# Patient Record
Sex: MALE | Race: WHITE | Employment: FULL TIME | URBAN - METROPOLITAN AREA
[De-identification: names, ages, dates, MRNs, and addresses within clinical notes are randomized per-mention and may not be internally consistent; named-entity substitution may affect disease eponyms.]

---

## 2022-01-16 ENCOUNTER — HOSPITAL ENCOUNTER (EMERGENCY)
Facility: HOSPITAL | Age: 42
Discharge: HOME | End: 2022-01-17
Payer: MEDICAID

## 2022-01-16 ENCOUNTER — APPOINTMENT (OUTPATIENT)
Dept: ULTRASOUND IMAGING | Facility: HOSPITAL | Age: 42
End: 2022-01-16
Payer: MEDICAID

## 2022-01-16 DIAGNOSIS — I82.411 THROMBOSIS OF RIGHT FEMORAL VEIN (HCC): Primary | ICD-10-CM

## 2022-01-16 PROCEDURE — 80048 BASIC METABOLIC PNL TOTAL CA: CPT | Performed by: NURSE PRACTITIONER

## 2022-01-16 PROCEDURE — 99284 EMERGENCY DEPT VISIT MOD MDM: CPT

## 2022-01-16 PROCEDURE — 93971 EXTREMITY STUDY: CPT

## 2022-01-16 PROCEDURE — 36415 COLL VENOUS BLD VENIPUNCTURE: CPT

## 2022-01-17 VITALS
DIASTOLIC BLOOD PRESSURE: 66 MMHG | SYSTOLIC BLOOD PRESSURE: 122 MMHG | OXYGEN SATURATION: 95 % | WEIGHT: 170 LBS | BODY MASS INDEX: 23.03 KG/M2 | TEMPERATURE: 98 F | HEART RATE: 80 BPM | HEIGHT: 72 IN | RESPIRATION RATE: 18 BRPM

## 2022-01-17 LAB
ANION GAP SERPL CALC-SCNC: 5 MMOL/L (ref 0–18)
BUN BLD-MCNC: 15 MG/DL (ref 7–18)
BUN/CREAT SERPL: 17.6 (ref 10–20)
CALCIUM BLD-MCNC: 8.9 MG/DL (ref 8.5–10.1)
CHLORIDE SERPL-SCNC: 110 MMOL/L (ref 98–112)
CO2 SERPL-SCNC: 29 MMOL/L (ref 21–32)
CREAT BLD-MCNC: 0.85 MG/DL
GLUCOSE BLD-MCNC: 96 MG/DL (ref 70–99)
OSMOLALITY SERPL CALC.SUM OF ELEC: 299 MOSM/KG (ref 275–295)
POTASSIUM SERPL-SCNC: 3.9 MMOL/L (ref 3.5–5.1)
SODIUM SERPL-SCNC: 144 MMOL/L (ref 136–145)

## 2022-01-17 NOTE — CM/SW NOTE
Received call from patient stating that he was discharged on Xarelto but when he went to [place] in [facility] with his written prescription the pharmacist stated that he would need a prior auth from his PCP.     [facility] called [address]

## 2022-01-17 NOTE — ED INITIAL ASSESSMENT (HPI)
Patient here with R calf pain that began about a week ago. Patient denies trauma or injury. Patient states he is concerned for blood clot.

## 2022-01-17 NOTE — ED PROVIDER NOTES
Patient Seen in: [facility] AND [facility] Emergency Department      History   Patient presents with:  Leg Pain    Stated Complaint: R calf pain    Subjective:   40yo/m with no chronic medical problems reports to the ED with complaints of right calf pain x 1 wee Rate and Rhythm: Normal rate and regular rhythm. Heart sounds: Normal heart sounds. Pulmonary:      Effort: Pulmonary effort is normal.      Breath sounds: Normal breath sounds.    Abdominal:      General: Bowel sounds are normal.      [facility] [id number]  [phone]    In 2 days            Medications Prescribed:  Current Discharge Medication List    START taking these medications    rivaroxaban 15 & 20 MG Oral Tablet Therapy Pack  Take As Directed based on package instru

## 2022-01-17 NOTE — ED QUICK NOTES
Pt to the ed for reports of right calf pain taht started 1 week ago. No injury prior to onset of pain. CMS intact, distal pulses palpable. Continue to monitor.

## 2022-02-14 PROBLEM — Z79.01 LONG TERM (CURRENT) USE OF ANTICOAGULANTS: Status: ACTIVE | Noted: 2022-02-14

## 2022-02-14 PROBLEM — I82.409 ACUTE DVT (DEEP VENOUS THROMBOSIS) (HCC): Status: ACTIVE | Noted: 2022-02-14

## 2024-01-07 ENCOUNTER — HOSPITAL ENCOUNTER (INPATIENT)
Facility: HOSPITAL | Age: 44
LOS: 1 days | Discharge: HOME | End: 2024-01-09
Attending: EMERGENCY MEDICINE | Admitting: INTERNAL MEDICINE
Payer: MEDICAID

## 2024-01-07 DIAGNOSIS — R79.89 ELEVATED TROPONIN: Primary | ICD-10-CM

## 2024-01-07 DIAGNOSIS — I26.02 ACUTE SADDLE PULMONARY EMBOLISM WITH ACUTE COR PULMONALE (HCC): ICD-10-CM

## 2024-01-07 LAB
ANION GAP SERPL CALC-SCNC: 6 MMOL/L (ref 0–18)
BASOPHILS # BLD AUTO: 0.05 X10(3) UL (ref 0–0.2)
BASOPHILS NFR BLD AUTO: 0.5 %
BUN BLD-MCNC: 12 MG/DL (ref 9–23)
BUN/CREAT SERPL: 15.8 (ref 10–20)
CALCIUM BLD-MCNC: 9.1 MG/DL (ref 8.7–10.4)
CHLORIDE SERPL-SCNC: 105 MMOL/L (ref 98–112)
CHOLEST SERPL-MCNC: 153 MG/DL (ref ?–200)
CO2 SERPL-SCNC: 28 MMOL/L (ref 21–32)
CREAT BLD-MCNC: 0.76 MG/DL
DEPRECATED RDW RBC AUTO: 39.8 FL (ref 35.1–46.3)
EGFRCR SERPLBLD CKD-EPI 2021: 114 ML/MIN/1.73M2 (ref 60–?)
EOSINOPHIL # BLD AUTO: 0.1 X10(3) UL (ref 0–0.7)
EOSINOPHIL NFR BLD AUTO: 1.1 %
ERYTHROCYTE [DISTWIDTH] IN BLOOD BY AUTOMATED COUNT: 12.5 % (ref 11–15)
FLUAV + FLUBV RNA SPEC NAA+PROBE: NEGATIVE
FLUAV + FLUBV RNA SPEC NAA+PROBE: NEGATIVE
GLUCOSE BLD-MCNC: 121 MG/DL (ref 70–99)
HCT VFR BLD AUTO: 43.1 %
HDLC SERPL-MCNC: 39 MG/DL (ref 40–59)
HGB BLD-MCNC: 15.2 G/DL
IMM GRANULOCYTES # BLD AUTO: 0.02 X10(3) UL (ref 0–1)
IMM GRANULOCYTES NFR BLD: 0.2 %
LDLC SERPL CALC-MCNC: 100 MG/DL (ref ?–100)
LYMPHOCYTES # BLD AUTO: 0.92 X10(3) UL (ref 1–4)
LYMPHOCYTES NFR BLD AUTO: 9.8 %
MCH RBC QN AUTO: 30.8 PG (ref 26–34)
MCHC RBC AUTO-ENTMCNC: 35.3 G/DL (ref 31–37)
MCV RBC AUTO: 87.4 FL
MONOCYTES # BLD AUTO: 0.61 X10(3) UL (ref 0.1–1)
MONOCYTES NFR BLD AUTO: 6.5 %
NEUTROPHILS # BLD AUTO: 7.72 X10 (3) UL (ref 1.5–7.7)
NEUTROPHILS # BLD AUTO: 7.72 X10(3) UL (ref 1.5–7.7)
NEUTROPHILS NFR BLD AUTO: 81.9 %
NONHDLC SERPL-MCNC: 114 MG/DL (ref ?–130)
OSMOLALITY SERPL CALC.SUM OF ELEC: 289 MOSM/KG (ref 275–295)
PLATELET # BLD AUTO: 185 10(3)UL (ref 150–450)
POTASSIUM SERPL-SCNC: 4.3 MMOL/L (ref 3.5–5.1)
RBC # BLD AUTO: 4.93 X10(6)UL
RSV RNA SPEC NAA+PROBE: NEGATIVE
SARS-COV-2 RNA RESP QL NAA+PROBE: NOT DETECTED
SODIUM SERPL-SCNC: 139 MMOL/L (ref 136–145)
TRIGL SERPL-MCNC: 71 MG/DL (ref 30–149)
TROPONIN I SERPL HS-MCNC: 553 NG/L
VLDLC SERPL CALC-MCNC: 12 MG/DL (ref 0–30)
WBC # BLD AUTO: 9.4 X10(3) UL (ref 4–11)

## 2024-01-07 PROCEDURE — 99285 EMERGENCY DEPT VISIT HI MDM: CPT

## 2024-01-07 PROCEDURE — 93010 ELECTROCARDIOGRAM REPORT: CPT

## 2024-01-07 PROCEDURE — 80048 BASIC METABOLIC PNL TOTAL CA: CPT | Performed by: EMERGENCY MEDICINE

## 2024-01-07 PROCEDURE — 93005 ELECTROCARDIOGRAM TRACING: CPT

## 2024-01-07 PROCEDURE — 96361 HYDRATE IV INFUSION ADD-ON: CPT

## 2024-01-07 PROCEDURE — 84484 ASSAY OF TROPONIN QUANT: CPT | Performed by: EMERGENCY MEDICINE

## 2024-01-07 PROCEDURE — 85025 COMPLETE CBC W/AUTO DIFF WBC: CPT | Performed by: EMERGENCY MEDICINE

## 2024-01-07 PROCEDURE — 80061 LIPID PANEL: CPT | Performed by: EMERGENCY MEDICINE

## 2024-01-07 PROCEDURE — 0241U SARS-COV-2/FLU A AND B/RSV BY PCR (GENEXPERT): CPT | Performed by: EMERGENCY MEDICINE

## 2024-01-08 ENCOUNTER — APPOINTMENT (OUTPATIENT)
Dept: ULTRASOUND IMAGING | Facility: HOSPITAL | Age: 44
End: 2024-01-08
Attending: STUDENT IN AN ORGANIZED HEALTH CARE EDUCATION/TRAINING PROGRAM
Payer: MEDICAID

## 2024-01-08 ENCOUNTER — APPOINTMENT (OUTPATIENT)
Dept: CV DIAGNOSTICS | Facility: HOSPITAL | Age: 44
End: 2024-01-08
Attending: STUDENT IN AN ORGANIZED HEALTH CARE EDUCATION/TRAINING PROGRAM
Payer: MEDICAID

## 2024-01-08 ENCOUNTER — APPOINTMENT (OUTPATIENT)
Dept: CT IMAGING | Facility: HOSPITAL | Age: 44
End: 2024-01-08
Attending: EMERGENCY MEDICINE
Payer: MEDICAID

## 2024-01-08 ENCOUNTER — APPOINTMENT (OUTPATIENT)
Dept: INTERVENTIONAL RADIOLOGY/VASCULAR | Facility: HOSPITAL | Age: 44
End: 2024-01-08
Attending: RADIOLOGY
Payer: MEDICAID

## 2024-01-08 PROBLEM — I26.02 ACUTE SADDLE PULMONARY EMBOLISM WITH ACUTE COR PULMONALE (HCC): Status: ACTIVE | Noted: 2024-01-08

## 2024-01-08 LAB
ALBUMIN SERPL-MCNC: 3.7 G/DL (ref 3.2–4.8)
ALBUMIN/GLOB SERPL: 1.4 {RATIO} (ref 1–2)
ALP LIVER SERPL-CCNC: 176 U/L
ALT SERPL-CCNC: 140 U/L
ANION GAP SERPL CALC-SCNC: 5 MMOL/L (ref 0–18)
APTT PPP: 30.5 SECONDS (ref 23.3–35.6)
APTT PPP: 49.1 SECONDS (ref 23.3–35.6)
APTT PPP: 58 SECONDS (ref 23.3–35.6)
AST SERPL-CCNC: 76 U/L (ref ?–34)
ATRIAL RATE: 123 BPM
BASOPHILS # BLD AUTO: 0.06 X10(3) UL (ref 0–0.2)
BASOPHILS NFR BLD AUTO: 0.7 %
BILIRUB SERPL-MCNC: 2.2 MG/DL (ref 0.3–1.2)
BUN BLD-MCNC: 10 MG/DL (ref 9–23)
BUN/CREAT SERPL: 16.4 (ref 10–20)
CALCIUM BLD-MCNC: 8.7 MG/DL (ref 8.7–10.4)
CHLORIDE SERPL-SCNC: 109 MMOL/L (ref 98–112)
CO2 SERPL-SCNC: 24 MMOL/L (ref 21–32)
CREAT BLD-MCNC: 0.61 MG/DL
DEPRECATED RDW RBC AUTO: 40.2 FL (ref 35.1–46.3)
EGFRCR SERPLBLD CKD-EPI 2021: 122 ML/MIN/1.73M2 (ref 60–?)
EOSINOPHIL # BLD AUTO: 0.16 X10(3) UL (ref 0–0.7)
EOSINOPHIL NFR BLD AUTO: 2 %
ERYTHROCYTE [DISTWIDTH] IN BLOOD BY AUTOMATED COUNT: 12.6 % (ref 11–15)
GLOBULIN PLAS-MCNC: 2.7 G/DL (ref 2.8–4.4)
GLUCOSE BLD-MCNC: 114 MG/DL (ref 70–99)
HCT VFR BLD AUTO: 40.8 %
HGB BLD-MCNC: 13.7 G/DL
IMM GRANULOCYTES # BLD AUTO: 0.02 X10(3) UL (ref 0–1)
IMM GRANULOCYTES NFR BLD: 0.2 %
INR BLD: 1.14 (ref 0.8–1.2)
ISTAT ACTIVATED CLOTTING TIME: 179 SECONDS (ref 125–137)
LYMPHOCYTES # BLD AUTO: 1.64 X10(3) UL (ref 1–4)
LYMPHOCYTES NFR BLD AUTO: 20.2 %
MCH RBC QN AUTO: 29.4 PG (ref 26–34)
MCHC RBC AUTO-ENTMCNC: 33.6 G/DL (ref 31–37)
MCV RBC AUTO: 87.6 FL
MONOCYTES # BLD AUTO: 0.6 X10(3) UL (ref 0.1–1)
MONOCYTES NFR BLD AUTO: 7.4 %
NEUTROPHILS # BLD AUTO: 5.64 X10 (3) UL (ref 1.5–7.7)
NEUTROPHILS # BLD AUTO: 5.64 X10(3) UL (ref 1.5–7.7)
NEUTROPHILS NFR BLD AUTO: 69.5 %
OSMOLALITY SERPL CALC.SUM OF ELEC: 286 MOSM/KG (ref 275–295)
P AXIS: 61 DEGREES
P-R INTERVAL: 134 MS
PLATELET # BLD AUTO: 167 10(3)UL (ref 150–450)
POTASSIUM SERPL-SCNC: 3.8 MMOL/L (ref 3.5–5.1)
PROT SERPL-MCNC: 6.4 G/DL (ref 5.7–8.2)
PROTHROMBIN TIME: 15.3 SECONDS (ref 11.6–14.8)
Q-T INTERVAL: 318 MS
QRS DURATION: 86 MS
QTC CALCULATION (BEZET): 455 MS
R AXIS: 51 DEGREES
RBC # BLD AUTO: 4.66 X10(6)UL
SODIUM SERPL-SCNC: 138 MMOL/L (ref 136–145)
T AXIS: 30 DEGREES
TROPONIN I SERPL HS-MCNC: 223 NG/L
VENTRICULAR RATE: 123 BPM
WBC # BLD AUTO: 8.1 X10(3) UL (ref 4–11)

## 2024-01-08 PROCEDURE — 85730 THROMBOPLASTIN TIME PARTIAL: CPT | Performed by: INTERNAL MEDICINE

## 2024-01-08 PROCEDURE — 85610 PROTHROMBIN TIME: CPT | Performed by: INTERNAL MEDICINE

## 2024-01-08 PROCEDURE — B31T1ZZ FLUOROSCOPY OF LEFT PULMONARY ARTERY USING LOW OSMOLAR CONTRAST: ICD-10-PCS | Performed by: RADIOLOGY

## 2024-01-08 PROCEDURE — 99153 MOD SED SAME PHYS/QHP EA: CPT | Performed by: RADIOLOGY

## 2024-01-08 PROCEDURE — 85347 COAGULATION TIME ACTIVATED: CPT

## 2024-01-08 PROCEDURE — 02CQ3ZZ EXTIRPATION OF MATTER FROM RIGHT PULMONARY ARTERY, PERCUTANEOUS APPROACH: ICD-10-PCS | Performed by: RADIOLOGY

## 2024-01-08 PROCEDURE — 93970 EXTREMITY STUDY: CPT | Performed by: STUDENT IN AN ORGANIZED HEALTH CARE EDUCATION/TRAINING PROGRAM

## 2024-01-08 PROCEDURE — 85025 COMPLETE CBC W/AUTO DIFF WBC: CPT | Performed by: INTERNAL MEDICINE

## 2024-01-08 PROCEDURE — 36014 PLACE CATHETER IN ARTERY: CPT | Performed by: RADIOLOGY

## 2024-01-08 PROCEDURE — 85730 THROMBOPLASTIN TIME PARTIAL: CPT | Performed by: EMERGENCY MEDICINE

## 2024-01-08 PROCEDURE — 99152 MOD SED SAME PHYS/QHP 5/>YRS: CPT | Performed by: RADIOLOGY

## 2024-01-08 PROCEDURE — 37184 PRIM ART M-THRMBC 1ST VSL: CPT | Performed by: RADIOLOGY

## 2024-01-08 PROCEDURE — 93306 TTE W/DOPPLER COMPLETE: CPT | Performed by: STUDENT IN AN ORGANIZED HEALTH CARE EDUCATION/TRAINING PROGRAM

## 2024-01-08 PROCEDURE — 84484 ASSAY OF TROPONIN QUANT: CPT | Performed by: STUDENT IN AN ORGANIZED HEALTH CARE EDUCATION/TRAINING PROGRAM

## 2024-01-08 PROCEDURE — B31S1ZZ FLUOROSCOPY OF RIGHT PULMONARY ARTERY USING LOW OSMOLAR CONTRAST: ICD-10-PCS | Performed by: RADIOLOGY

## 2024-01-08 PROCEDURE — 71260 CT THORAX DX C+: CPT | Performed by: EMERGENCY MEDICINE

## 2024-01-08 PROCEDURE — 85730 THROMBOPLASTIN TIME PARTIAL: CPT | Performed by: STUDENT IN AN ORGANIZED HEALTH CARE EDUCATION/TRAINING PROGRAM

## 2024-01-08 PROCEDURE — 02CR3ZZ EXTIRPATION OF MATTER FROM LEFT PULMONARY ARTERY, PERCUTANEOUS APPROACH: ICD-10-PCS | Performed by: RADIOLOGY

## 2024-01-08 PROCEDURE — 80053 COMPREHEN METABOLIC PANEL: CPT | Performed by: INTERNAL MEDICINE

## 2024-01-08 PROCEDURE — 96374 THER/PROPH/DIAG INJ IV PUSH: CPT

## 2024-01-08 RX ORDER — HEPARIN SODIUM AND DEXTROSE 10000; 5 [USP'U]/100ML; G/100ML
18 INJECTION INTRAVENOUS ONCE
Status: COMPLETED | OUTPATIENT
Start: 2024-01-08 | End: 2024-01-08

## 2024-01-08 RX ORDER — HEPARIN SODIUM 1000 [USP'U]/ML
80 INJECTION, SOLUTION INTRAVENOUS; SUBCUTANEOUS ONCE
Status: DISCONTINUED | OUTPATIENT
Start: 2024-01-08 | End: 2024-01-08

## 2024-01-08 RX ORDER — HYDROCODONE BITARTRATE AND ACETAMINOPHEN 5; 325 MG/1; MG/1
1 TABLET ORAL ONCE
Status: COMPLETED | OUTPATIENT
Start: 2024-01-08 | End: 2024-01-08

## 2024-01-08 RX ORDER — KETOROLAC TROMETHAMINE 30 MG/ML
30 INJECTION, SOLUTION INTRAMUSCULAR; INTRAVENOUS EVERY 6 HOURS PRN
Status: DISCONTINUED | OUTPATIENT
Start: 2024-01-08 | End: 2024-01-09

## 2024-01-08 RX ORDER — MIDAZOLAM HYDROCHLORIDE 1 MG/ML
INJECTION INTRAMUSCULAR; INTRAVENOUS
Status: COMPLETED
Start: 2024-01-08 | End: 2024-01-08

## 2024-01-08 RX ORDER — ONDANSETRON 2 MG/ML
4 INJECTION INTRAMUSCULAR; INTRAVENOUS EVERY 6 HOURS PRN
Status: DISCONTINUED | OUTPATIENT
Start: 2024-01-08 | End: 2024-01-09

## 2024-01-08 RX ORDER — HEPARIN SODIUM 1000 [USP'U]/ML
80 INJECTION, SOLUTION INTRAVENOUS; SUBCUTANEOUS ONCE
Status: COMPLETED | OUTPATIENT
Start: 2024-01-08 | End: 2024-01-08

## 2024-01-08 RX ORDER — ACETAMINOPHEN 500 MG
500 TABLET ORAL EVERY 4 HOURS PRN
Status: DISCONTINUED | OUTPATIENT
Start: 2024-01-08 | End: 2024-01-09

## 2024-01-08 RX ORDER — LIDOCAINE HYDROCHLORIDE 20 MG/ML
INJECTION, SOLUTION INFILTRATION; PERINEURAL
Status: COMPLETED
Start: 2024-01-08 | End: 2024-01-08

## 2024-01-08 RX ORDER — HEPARIN SODIUM 1000 [USP'U]/ML
INJECTION, SOLUTION INTRAVENOUS; SUBCUTANEOUS
Status: COMPLETED
Start: 2024-01-08 | End: 2024-01-08

## 2024-01-08 RX ORDER — PROCHLORPERAZINE EDISYLATE 5 MG/ML
5 INJECTION INTRAMUSCULAR; INTRAVENOUS EVERY 8 HOURS PRN
Status: DISCONTINUED | OUTPATIENT
Start: 2024-01-08 | End: 2024-01-09

## 2024-01-08 RX ORDER — HEPARIN SODIUM AND DEXTROSE 10000; 5 [USP'U]/100ML; G/100ML
18 INJECTION INTRAVENOUS ONCE
Status: DISCONTINUED | OUTPATIENT
Start: 2024-01-08 | End: 2024-01-08

## 2024-01-08 RX ORDER — HEPARIN SODIUM 1000 [USP'U]/ML
30 INJECTION, SOLUTION INTRAVENOUS; SUBCUTANEOUS ONCE
Status: COMPLETED | OUTPATIENT
Start: 2024-01-08 | End: 2024-01-08

## 2024-01-08 RX ORDER — NICOTINE 21 MG/24HR
1 PATCH, TRANSDERMAL 24 HOURS TRANSDERMAL DAILY
Status: DISCONTINUED | OUTPATIENT
Start: 2024-01-08 | End: 2024-01-09

## 2024-01-08 RX ORDER — HEPARIN SODIUM AND DEXTROSE 10000; 5 [USP'U]/100ML; G/100ML
INJECTION INTRAVENOUS CONTINUOUS
Status: DISCONTINUED | OUTPATIENT
Start: 2024-01-08 | End: 2024-01-09

## 2024-01-08 NOTE — ED PROVIDER NOTES
Patient endorsed pending CT chest.  CT with concerning findings of saddle PE and evidence of right heart strain.  Patient with ongoing tachycardia in the ED however other vital signs normal.  Patient was started on heparin.  I discussed with Dr. Wiggins for admission and Dr. Enrique for IR consultation.  Dr. Enrique recommends heparin anticoagulation overnight, n.p.o. status and plan for intervention in the morning.

## 2024-01-08 NOTE — PLAN OF CARE
Problem: Patient Centered Care  Goal: Patient preferences are identified and integrated in the patient's plan of care  Description: Interventions:  - What would you like us to know as we care for you? Im from home  - Provide timely, complete, and accurate information to patient/family  - Incorporate patient and family knowledge, values, beliefs, and cultural backgrounds into the planning and delivery of care  - Encourage patient/family to participate in care and decision-making at the level they choose  - Honor patient and family perspectives and choices  Outcome: Progressing     Problem: Patient/Family Goals  Goal: Patient/Family Long Term Goal  Description: Patient's Long Term Goal: Get stronger    Interventions:  - See additional Care Plan goals for specific interventions  Outcome: Progressing  Goal: Patient/Family Short Term Goal  Description: Patient's Short Term Goal: Go home    Interventions:   - See additional Care Plan goals for specific interventions  Outcome: Progressing     Problem: CARDIOVASCULAR - ADULT  Goal: Maintains optimal cardiac output and hemodynamic stability  Description: INTERVENTIONS:  - Monitor vital signs, rhythm, and trends  - Monitor for bleeding, hypotension and signs of decreased cardiac output  - Evaluate effectiveness of vasoactive medications to optimize hemodynamic stability  - Monitor arterial and/or venous puncture sites for bleeding and/or hematoma  - Assess quality of pulses, skin color and temperature  - Assess for signs of decreased coronary artery perfusion - ex. Angina  - Evaluate fluid balance, assess for edema, trend weights  Outcome: Progressing  Goal: Absence of cardiac arrhythmias or at baseline  Description: INTERVENTIONS:  - Continuous cardiac monitoring, monitor vital signs, obtain 12 lead EKG if indicated  - Evaluate effectiveness of antiarrhythmic and heart rate control medications as ordered  - Initiate emergency measures for life threatening arrhythmias  -  Monitor electrolytes and administer replacement therapy as ordered  Outcome: Progressing     Problem: RESPIRATORY - ADULT  Goal: Achieves optimal ventilation and oxygenation  Description: INTERVENTIONS:  - Assess for changes in respiratory status  - Assess for changes in mentation and behavior  - Position to facilitate oxygenation and minimize respiratory effort  - Oxygen supplementation based on oxygen saturation or ABGs  - Provide Smoking Cessation handout, if applicable  - Encourage broncho-pulmonary hygiene including cough, deep breathe, Incentive Spirometry  - Assess the need for suctioning and perform as needed  - Assess and instruct to report SOB or any respiratory difficulty  - Respiratory Therapy support as indicated  - Manage/alleviate anxiety  - Monitor for signs/symptoms of CO2 retention  Outcome: Progressing

## 2024-01-08 NOTE — ED QUICK NOTES
Orders for admission, patient is aware of plan and ready to go upstairs. Any questions, please call ED RN Toni at extension [phone].     Patient Covid vaccination status: Unvaccinated     COVID Test Ordered in ED: SARS-CoV-2/Flu A and B/RSV by PCR ([facility])    COVID Suspicion at Admission: N/A    Running Infusions:  Heparin @ 16 mL/hr    Mental Status/LOC at time of transport: A&Ox3    Other pertinent information:   CIWA score: N/A   NIH score:  N/A

## 2024-01-08 NOTE — ED QUICK NOTES
Report given to IVAN Sosa. Pt and family are aware of admit and have no further questions at this time. Pt stable and resting comfortably in bed

## 2024-01-08 NOTE — CONSULTS
[facility]    Report of Consultation    Sterling Thomas Patient Status:  Inpatient    1980 MRN [id number]   Location [facility] 1W Attending Racquel Mi,    Hosp Day # 0 PCP Anita Colunga MD     Reason for Consultation:  SOB, PE.    History of Present Illness:  Sterling Thomas is a a(n) 43 year old male with history of a right leg DVT.  He was admitted with chest pain and SOB that started yesterday afternoon.  CT shows bilateral PE with right heart strain.  Troponins are elevated, ECHO is pending.  Currently he is comfortable, denies SOB.  O2 Sat 97%.     History:  Past Medical History:   Diagnosis Date    DVT of deep femoral vein, right (HCC)      History reviewed. No pertinent surgical history.  Family History   Problem Relation Age of Onset    No Known Problems Mother     No Known Problems Father     DVT/VTE Neg       reports that he has been smoking. He has been smoking an average of 1 pack per day. He has never used smokeless tobacco. He reports that he does not currently use alcohol. He reports that he does not currently use drugs.    Allergies:  No Known Allergies    Medications:    Current Facility-Administered Medications:     heparin (Porcine) 74295 units/250mL infusion CONTINUOUS, 200-3,000 Units/hr, Intravenous, Continuous    acetaminophen (Tylenol Extra Strength) tab 500 mg, 500 mg, Oral, Q4H PRN    ondansetron (Zofran) 4 MG/2ML injection 4 mg, 4 mg, Intravenous, Q6H PRN    prochlorperazine (Compazine) 10 MG/2ML injection 5 mg, 5 mg, Intravenous, Q8H PRN    Review of Systems:  A comprehensive review of systems was negative except for: Cardiovascular: positive for chest pain    Physical Exam:   General: Alert, orientated x3.  Cooperative.  No apparent distress.  Vital Signs:  Blood pressure (!) 127/91, pulse 102, temperature 98.3 °F (36.8 °C), temperature source Oral, resp. rate 20, height 72\", weight 208 lb 5.4 oz (94.5 kg), SpO2 94%.  HEENT: Exam is  unremarkable.  Neck: Supple.  Lungs: Normal respiratory effort  Cardiac: Regular rate and rhythm.  Abdomen:  Nontender.  Extremities:  No lower extremity edema noted.  Skin: Normal texture and turgor.    Laboratory Data:  Lab Results   Component Value Date    WBC 9.4 01/07/2024    HGB 15.2 01/07/2024    HCT 43.1 01/07/2024    .0 01/07/2024    CREATSERUM 0.76 01/07/2024    BUN 12 01/07/2024     01/07/2024    K 4.3 01/07/2024     01/07/2024    CO2 28.0 01/07/2024     01/07/2024    CA 9.1 01/07/2024    PTT 30.5 01/08/2024       Impression:  Patient Active Problem List   Diagnosis    Long term (current) use of anticoagulants    Acute DVT (deep venous thrombosis) (HCC)    Elevated troponin    Acute saddle pulmonary embolism with acute cor pulmonale (HCC)     Assessment/Plan:  44yo with history of a right leg DVT 2 years ago, who finished course of anticoagulation, is admitted with CT findings of an acute PE.  His symptoms of DUE and chest pain started suddenly yesterday afternoon. He denies current SOB, O2 sat 95-97% on room air.    Plan is for pulmonary artery thrombectomy. He would like his wife to be present before discussing the risks and benefits.       Thank you for allowing me to participate in the care of your patient.    JENVICKI VELÁSQUEZ, APRN  1/8/2024  8:21 AM

## 2024-01-08 NOTE — PROCEDURES
[facility]  Procedure Note    Sterling Thomas Patient Status:  Inpatient    1980 MRN [id number]   Location [facility] 1W Attending Racquel Mi,    Hosp Day # 0 PCP Anita Colunga MD     Procedure: PA angiogram with thrombectomy    Pre-Procedure Diagnosis: Elevated troponin [R79.89]  Acute saddle pulmonary embolism with acute cor pulmonale (HCC) [I26.02]      Post-Procedure Diagnosis: Elevated troponin [R79.89]  Acute saddle pulmonary embolism with acute cor pulmonale (HCC) [I26.02]    Anesthesia:  Local and Sedation    Findings:  Right CFV accessed under ultrasound.  16Fr sheath placed.    Left PA thrombectomy performed with 16Fr Flash device.  Right PA thrombectomy also performed.  Final angiogram with marked reduction in clot burden.    PA pressures:  Pre - peak 53mmHg, mean 37 mmHg   Post - peak 42 mmHg, mean 30 mmHg    Specimens: None    Blood Loss:  180mL      Complications:  None    Plan:  Transfer back to room.  Continue systemic anticoagulation.  Duration of anticoagulation per Hematology.    STEFANY SEPULVEDA MD  2024

## 2024-01-08 NOTE — CONSULTS
Brief IR Consult Note    Mr. Thomas is a 43 year old male with history of prior right lower extremity DVT (1/16/22, now not on A/C) who presents with acute shortness of breath found to have acute pulmonary emboli. In the ED, his vitals were -129, -130s, and satting 93-98% on room air. His labs were notable for Hgb 15.2, plt 185, , with troponin of 553. He underwent a CT PE, which is notable for bilateral lobar PE with associated saddle PE and evidence of right heart strain (RA/RV > 0.9) with bilateral peripheral wedge-shaped consolidations concerning for evolving lung infarcts. No history of pre-syncopal event.     Submassive, intermediate-high risk PE with hemodynamic stability at this time. The elevated cardiac biomarkers and suggestion of right-heart strain on CT makes this a high-risk intermediate risk PE, which coincides with a sPESI score of 1 (HR > 110). Given these findings would offer catheter-directed thrombectomy in conjunction with initiation of anticoagulation.    - Heparin gtt per primary team   - Please keep NPO  - Please obtain stat TTE  - Tentative plan for catheter-directed thrombectomy in the AM pending changes in clinical status  - Please let IR know if change in clinical status for which thrombectomy would be offered sooner    Brain Enrique MD  Interventional Radiology

## 2024-01-08 NOTE — CONSULTS
Pulmonary Consult     Assessment / Plan:  Acute pulmonary embolism - submassive PE with multiple pulmonary infarcts s/p mechanical thrombectomy of bilateral PA with reduction in clot burden  - Heparin gtt; transition to DOAC indefinitely at DC  - Needs hypercoag w/u and malignancy screen at discharge  - Echo with acute pHTN and reduced right sided systolic failure  - LE doppler positive for DVT  Pulmonary infarct - d/t PE above  - Pain control w/ alternating toradol & tylenol  - Repeat imaging in 6-8 weeks  Pulmonary hypertension - acute from submassive PE  - Repeat TTE 6 months to ensure resolution  Tobacco abuse   - Cessation advised  Dispo  - Will follow with you    Bairon Jordan,   Pulmonary and Critical Care Medicine      History of Present Illness:   Mr. Thomas is a 43 year old male, born on 11/11/1980 with history of PE and DVT who completed six months of warfarin treatment in January 2023.  Noted recurrent swelling of his RLE prior to arrival at the hospital.  Later started with dyspnea.  Found to have submassive saddle PE, numerous bilateral PE, DVT and pulmonary infarctions.  Remained hemodynamically stable and was taken for thrombectomy today with IR.  Has no complaints at this time    12 point ROS negative except per HPI.    Past Medical History:   Diagnosis Date    DVT of deep femoral vein, right (HCC)        History reviewed. No pertinent surgical history.    Medications Prior to Admission   Medication Sig Dispense Refill Last Dose    warfarin 5 MG Oral Tab Pt taking 3 tabs (15mg) Wed, 2 tabs (10mg) all other days as directed by coumadin clinic 180 tablet 1      No outpatient medications have been marked as taking for the 1/7/24 encounter (Hospital Encounter).      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      Social Determinants of Health     Food Insecurity: No Food Insecurity (1/8/2024)    Food Insecurity     Food Insecurity: Never true   Transportation Needs: No Transportation Needs (1/8/2024)    Transportation Needs     Lack of Transportation: No   Housing Stability: Low Risk  (1/8/2024)    Housing Stability     Housing Instability: No       Family History   Problem Relation Age of Onset    No Known Problems Mother     No Known Problems Father     DVT/VTE Neg          Exam:  Vitals:    01/08/24 1400 01/08/24 1430 01/08/24 1500 01/08/24 1530   BP: 111/77 112/81 108/76 117/80   BP Location: Right arm Right arm Right arm Right arm   Pulse: 106 103 101 101   Resp: 16 16 22 24   Temp: 98.3 °F (36.8 °C)      TempSrc: Oral      SpO2: 94% 95% 95% 96%   Weight:       Height:         General: no apparent distress, conversant  Skin: no rash, ulcers or subcutaneous nodules  Eyes: anicteric sclerae, moist conjunctivae  Head, ears, nose, throat: atraumatic, oropharynx clear with moist mucous membranes  Neck: trachea midline with no thyromegaly  Heart: regular rate and rhythm, no murmurs / rubs / gallops  Lungs: clear bilaterally, normal respiratory effort, no accessory muscle use  Extremities: LE swelling (L>R)  Psych: interactive, answering questions appropriately, appropriate affect    Labs:  Reviewed in EMR    Inpatient Medications:  Reviewed in EMR    Imaging:   Chest imaging reviewed

## 2024-01-08 NOTE — ED PROVIDER NOTES
Patient Seen in: [facility] Emergency Department    History     Chief Complaint   Patient presents with    Chest Pain    Shortness Of Breath       HPI    43-year-old male presents ER with complaint of acute shortness of breath.  Patient with a past medical history of DVT in the past no longer on blood thinners.  Patient states he been having left lower extremity swelling now for the past few days.  Patient states he became acutely short of breath few hours ago.  Patient states he feels short of breath when he exerts himself.  Patient arrives to the ER tachycardic but satting 95% room air.  Patient denies any chest pain    History reviewed.   Past Medical History:   Diagnosis Date    DVT of deep femoral vein, right (HCC)        History reviewed. History reviewed. No pertinent surgical history.      Medications :  (Not in a hospital admission)       Family History   Problem Relation Age of Onset    No Known Problems Mother     No Known Problems Father     DVT/VTE Neg        Smoking Status:   Social History     Socioeconomic History    Marital status:    Tobacco Use    Smoking status: Every Day     Packs/day: 1     Types: Cigarettes    Smokeless tobacco: Never   Vaping Use    Vaping Use: Never used   Substance and Sexual Activity    Alcohol use: Not Currently    Drug use: Not Currently       ROS  All pertinent positives for the review of systems are mentioned in the HPI  All other organ systems are reviewed and are negative.    Constitutional and vital signs reviewed.      Social History and Family History elements reviewed from today, pertinent positives to the presenting problem noted.    Physical Exam     ED Triage Vitals   BP 01/07/24 2132 122/79   Pulse 01/07/24 2132 (!) 123   Resp 01/07/24 2132 20   Temp 01/07/24 2132 99.1 °F (37.3 °C)   Temp src --    SpO2 01/07/24 2132 96 %   O2 Device 01/07/24 2230 None (Room air)       All measures to prevent infection transmission during my interaction with the  patient were taken. The patient was already wearing a droplet mask on my arrival to the room. Personal protective equipment including droplet mask, eye protection, and gloves were worn throughout the duration of the exam.  Handwashing was performed prior to and after the exam.  Stethoscope and any equipment used during my examination was cleaned with super sani-cloth germicidal wipes following the exam.     Physical Exam  Vitals and nursing note reviewed.   Constitutional:       Appearance: He is well-developed.   HENT:      Head: Normocephalic and atraumatic.      Right Ear: External ear normal.      Left Ear: External ear normal.      Nose: Nose normal.   Eyes:      Conjunctiva/sclera: Conjunctivae normal.      Pupils: Pupils are equal, round, and reactive to light.   Cardiovascular:      Rate and Rhythm: Regular rhythm. Tachycardia present.      Heart sounds: Normal heart sounds.   Pulmonary:      Effort: Pulmonary effort is normal.      Breath sounds: Normal breath sounds.   Abdominal:      General: Bowel sounds are normal.      Palpations: Abdomen is soft.   Musculoskeletal:         General: Normal range of motion.      Cervical back: Normal range of motion and neck supple.      Left lower leg: Tenderness present. Edema present.      Comments: Positive left lower extremity leg tightness on palpation, no erythema   Skin:     General: Skin is warm and dry.   Neurological:      Mental Status: He is alert and oriented to person, place, and time.      Deep Tendon Reflexes: Reflexes are normal and symmetric.   Psychiatric:         Behavior: Behavior normal.         Thought Content: Thought content normal.         Judgment: Judgment normal.         ED Course        Labs Reviewed   CBC W/ DIFFERENTIAL - Abnormal; Notable for the following components:       Result Value    Neutrophil Absolute Prelim 7.72 (*)     Neutrophil Absolute 7.72 (*)     Lymphocyte Absolute 0.92 (*)     All other components within normal limits    CBC WITH DIFFERENTIAL WITH PLATELET    Narrative:     The following orders were created for panel order CBC With Differential With Platelet.                  Procedure                               Abnormality         Status                                     ---------                               -----------         ------                                     CBC W/ DIFFERENTIAL[[id number]]          Abnormal            Final result                                                 Please view results for these tests on the individual orders.   BASIC METABOLIC PANEL (8)   TROPONIN I HIGH SENSITIVITY   SARS-COV-2/FLU A AND B/RSV BY PCR (GENEXPERT)     EKG    Rate, intervals and axes as noted on EKG Report.  Rate: 123  Rhythm: Sinus Rhythm  Reading: Sinus tachycardia, no ST deviation             Imaging Results Available and Reviewed while in ED: No results found.  ED Medications Administered:   Medications   sodium chloride 0.9 % IV bolus 1,000 mL (1,000 mL Intravenous New Bag 1/7/24 2226)         MDM     Vitals:    01/07/24 2132 01/07/24 2230   BP: 122/79 121/85   Pulse: (!) 123 (!) 124   Resp: 20 16   Temp: 99.1 °F (37.3 °C)    SpO2: 96% 95%   Weight: 86.2 kg    Height: 182.9 cm (6')      *I personally reviewed and interpreted all ED vitals.  I also personally reviewed all labs and imaging if ordered    Pulse Ox: 95%, Room air, Normal     Monitor Interpretation:   sinus tachycardia    Differential Diagnosis/ Diagnostic Considerations: Pulmonary embolism, pneumonia, URI, viral syndrome.    Medical Record Review: I personally reviewed available prior medical records for any recent pertinent discharge summaries, testing, and procedures and reviewed those reports.    Complicating Factors: The patient already has does not have any pertinent problems on file. to contribute to the complexity of this ED evaluation.    Medical Decision Making      Condition upon leaving the department: Stable    Disposition and Plan      Clinical Impression:  No diagnosis found.    Disposition:  There is no disposition on file for this visit.    Follow-up:  No follow-up provider specified.    Medications Prescribed:  Current Discharge Medication List

## 2024-01-08 NOTE — ED INITIAL ASSESSMENT (HPI)
Patient reports chest pain and SOB that began today around 1400. Hx of DVT 2 years ago. Denies being on blood thinners at this time. Pt is pallor in color.

## 2024-01-08 NOTE — H&P
[facility] Hospitalist H&P       CC:   Chief Complaint   Patient presents with    Chest Pain    Shortness Of Breath        PCP: Anita Colunga MD    History of Present Illness: Patient is a 43 year old male with PMH sig for RLE DVT in 1/16/22 s/p 6 months of coumadin no longer on AC who presented to the hospital with worsening LLE swelling and SOB. Patient says that his LLE started to swell about 3 days prior. Yesterday he acute became short of breath. Worse with exertion. Due to this he came to the hospital. In addition he admits to some L sided rib pain when he takes a deep breath or coughs but no other CP. Upon arrival to the ED, he was tachycardic in the 120s but on RA. Initial labwork was unremarkable. CTA chest showed a large saddle PE, extensive b/l Pes, multiple b/l pulmonary infarcts, and RV: LV ration of 1.4. He was started on hep gtt and admitted for further medical management,.     PMH  Past Medical History:   Diagnosis Date    DVT of deep femoral vein, right (HCC)         PSH  History reviewed. No pertinent surgical history.     ALL:  No Known Allergies     Home Medications:  No outpatient medications have been marked as taking for the 1/7/24 encounter (Hospital Encounter).         Soc Hx  Social History     Tobacco Use    Smoking status: Every Day     Packs/day: 1     Types: Cigarettes    Smokeless tobacco: Never   Substance Use Topics    Alcohol use: Not Currently        Fam Hx  Family History   Problem Relation Age of Onset    No Known Problems Mother     No Known Problems Father     DVT/VTE Neg        Review of Systems  Comprehensive ROS reviewed and negative except for what's stated above.     OBJECTIVE:  BP (!) 127/91 (BP Location: Right arm)   Pulse 102   Temp 98.3 °F (36.8 °C) (Oral)   Resp 20   Ht 6' (1.829 m)   Wt 208 lb 5.4 oz (94.5 kg)   SpO2 94%   BMI 28.26 kg/m²   General:  Alert, no distress   Head:  Normocephalic               Neck: Supple   Lungs:   Clear to  auscultation bilaterally   Chest wall:  No tenderness or deformity.       Abdomen:   Soft, non-tender. Bowel sounds normal   Extremities: LLE edema             Diagnostic Data:    CBC/Chem  Recent Labs   Lab 01/07/24  2219   WBC 9.4   HGB 15.2   MCV 87.4   .0       Recent Labs   Lab 01/07/24  2219      K 4.3      CO2 28.0   BUN 12   CREATSERUM 0.76   *   CA 9.1       No results for input(s): \"ALT\", \"AST\", \"ALB\", \"AMYLASE\", \"LIPASE\", \"LDH\" in the last 168 hours.    Invalid input(s): \"ALPHOS\", \"TBIL\", \"DBIL\", \"TPROT\"    No results for input(s): \"TROP\" in the last 168 hours.    Radiology: CT CHEST PE AORTA (IV ONLY) (CPT=71260)    Result Date: 1/8/2024  CONCLUSION:   Saddle PE with propagation throughout all lobes of the lungs including occlusive/near occlusive disease throughout the basilar arteries with imaging signs of RV strain  Multifocal basilar predominant bilateral pulmonary infarcts   [facility] provided a prelim report for this exam. This final report has no significant discrepancies with the [facility] report.   Dictated by (CST): Prince Penny MD on 1/08/2024 at 9:31 AM     Finalized by (CST): Prince Penny MD on 1/08/2024 at 9:34 AM             ASSESSMENT / PLAN:    Patient is a 43 year old male with PMH sig for RLE DVT in 1/16/22 s/p 6 months of coumadin no longer on AC who presented to the hospital with worsening LLE swelling and SOB.    Submassive PE  Acute saddle PE with b/l pulmonary infarcts  Cor pulmonale  - patient presented with LLE edema and acute onset SOB  - only risk factor is daily cigarette smoking, no hx of recent surgery/travel, no family hx of blood clots  - discussed outpatient testing for genetic causes  - CTA chest reviewed, shows saddle PE, extensive b/l PE, multiple pulmonary infarcts, RV: LV ratio of 1.4  - vitals are stable, no hypoxemia  - IR consulted, tentative plan for thrombectomy after discussions with patients wife  - on hep gtt, will transition  to likely coumadin for dc ( will check if DOAC is covered). Discussed that patient will need to be on life long anticoagulation  - check b/l LE venous dopplers  - STAT TTE ordered  - only risk factor is daily cigarette smoking    Elevated troponin  - likely demand ischemia from above  - initial troponin of 553  - EKG reviewed, shows sinus tachycardia, no acute ST changes  - will get another troponin for completeness sake  - TTE ordered    Hx of RLE DVT in 1/16/22  - s/p completion of 6 months of coumadin    Daily cigarette smoking  - smokes 1ppd  - counseled on smoking cessation for 5 minutes  - nicotine patch ordered    FN:  - IVF: none  - Diet: NPO    DVT Prophy: hep gtt  Lines: PIV    Dispo: pending clinical course    Outpatient records or previous hospital records reviewed.     Further recommendations pending patient's clinical course.  [facility] hospitalist to continue to follow patient while in house    Patient and/or patient's family given opportunity to ask questions and note understanding and agreeing with therapeutic plan as outlined    Thank You,  Racquel Mi DO    Hospitalist with [facility]  Answering Service number: [phone]

## 2024-01-08 NOTE — ED QUICK NOTES
Wife (Radha) would like to be called in the AM with estimated time to IR if possible.     [phone]

## 2024-01-09 VITALS
SYSTOLIC BLOOD PRESSURE: 110 MMHG | RESPIRATION RATE: 18 BRPM | OXYGEN SATURATION: 94 % | HEIGHT: 72 IN | HEART RATE: 106 BPM | DIASTOLIC BLOOD PRESSURE: 76 MMHG | WEIGHT: 208.31 LBS | BODY MASS INDEX: 28.22 KG/M2 | TEMPERATURE: 98 F

## 2024-01-09 LAB
ALBUMIN SERPL-MCNC: 3.8 G/DL (ref 3.2–4.8)
ALBUMIN/GLOB SERPL: 1.4 {RATIO} (ref 1–2)
ALP LIVER SERPL-CCNC: 150 U/L
ALT SERPL-CCNC: 99 U/L
ANION GAP SERPL CALC-SCNC: 6 MMOL/L (ref 0–18)
APTT PPP: 59.7 SECONDS (ref 23.3–35.6)
APTT PPP: 64.8 SECONDS (ref 23.3–35.6)
AST SERPL-CCNC: 31 U/L (ref ?–34)
BILIRUB SERPL-MCNC: 0.6 MG/DL (ref 0.3–1.2)
BUN BLD-MCNC: 14 MG/DL (ref 9–23)
BUN/CREAT SERPL: 20.3 (ref 10–20)
CALCIUM BLD-MCNC: 8.6 MG/DL (ref 8.7–10.4)
CHLORIDE SERPL-SCNC: 105 MMOL/L (ref 98–112)
CO2 SERPL-SCNC: 25 MMOL/L (ref 21–32)
CREAT BLD-MCNC: 0.69 MG/DL
EGFRCR SERPLBLD CKD-EPI 2021: 118 ML/MIN/1.73M2 (ref 60–?)
GLOBULIN PLAS-MCNC: 2.8 G/DL (ref 2.8–4.4)
GLUCOSE BLD-MCNC: 106 MG/DL (ref 70–99)
OSMOLALITY SERPL CALC.SUM OF ELEC: 283 MOSM/KG (ref 275–295)
POTASSIUM SERPL-SCNC: 3.5 MMOL/L (ref 3.5–5.1)
PROT SERPL-MCNC: 6.6 G/DL (ref 5.7–8.2)
SODIUM SERPL-SCNC: 136 MMOL/L (ref 136–145)

## 2024-01-09 PROCEDURE — 85730 THROMBOPLASTIN TIME PARTIAL: CPT | Performed by: STUDENT IN AN ORGANIZED HEALTH CARE EDUCATION/TRAINING PROGRAM

## 2024-01-09 PROCEDURE — 80053 COMPREHEN METABOLIC PANEL: CPT | Performed by: STUDENT IN AN ORGANIZED HEALTH CARE EDUCATION/TRAINING PROGRAM

## 2024-01-09 NOTE — PAYOR COMM NOTE
--------------  ADMISSION REVIEW     Payor: [facility]  Subscriber #:  [id number]  Authorization Number: [id number]    Admit date: 1/8/24  Admit time:  2:05 AM       1/7 Patient Seen in: [facility] Emergency Department     History          Chief Complaint   Patient presents with    Chest Pain    Shortness Of Breath       43-year-old male presents ER with complaint of acute shortness of breath.  Patient with a past medical history of DVT in the past no longer on blood thinners.  Patient states he been having left lower extremity swelling now for the past few days.  Patient states he became acutely short of breath few hours ago.  Patient states he feels short of breath when he exerts himself.  Patient arrives to the ER tachycardic but satting 95% room air.  Patient denies any chest pain     History reviewed.        Past Medical History:   Diagnosis Date    DVT of deep femoral vein, right (HCC)         History reviewed. History reviewed. No pertinent surgical history.        Physical Exam           ED Triage Vitals   BP 01/07/24 2132 122/79   Pulse 01/07/24 2132 (!) 123   Resp 01/07/24 2132 20   Temp 01/07/24 2132 99.1 °F (37.3 °C)   Temp src --     SpO2 01/07/24 2132 96 %   O2 Device 01/07/24 2230 None (Room air)        Physical Exam  Vitals and nursing note reviewed.   Constitutional:       Appearance: He is well-developed.   HENT:      Head: Normocephalic and atraumatic.      Right Ear: External ear normal.      Left Ear: External ear normal.      Nose: Nose normal.   Eyes:      Conjunctiva/sclera: Conjunctivae normal.      Pupils: Pupils are equal, round, and reactive to light.   Cardiovascular:      Rate and Rhythm: Regular rhythm. Tachycardia present.      Heart sounds: Normal heart sounds.   Pulmonary:      Effort: Pulmonary effort is normal.      Breath sounds: Normal breath sounds.   Abdominal:      General: Bowel sounds are normal.      Palpations: Abdomen is soft.    Musculoskeletal:         General: Normal range of motion.      Cervical back: Normal range of motion and neck supple.      Left lower leg: Tenderness present. Edema present.      Comments: Positive left lower extremity leg tightness on palpation, no erythema           Labs Reviewed   CBC W/ DIFFERENTIAL - Abnormal; Notable for the following components:       Result Value      Neutrophil Absolute Prelim 7.72 (*)       Neutrophil Absolute 7.72 (*)       Lymphocyte Absolute 0.92 (*)       All other components within normal limits   BASIC METABOLIC PANEL (8)   TROPONIN I HIGH SENSITIVITY   SARS-COV-2/FLU A AND B/RSV BY PCR (GENEXPERT)      EKG     Rate, intervals and axes as noted on EKG Report.  Rate: 123  Rhythm: Sinus Rhythm  Reading: Sinus tachycardia, no ST deviation        ED Medications Administered:   Medications   sodium chloride 0.9 % IV bolus 1,000 mL (1,000 mL Intravenous New Bag 1/7/24 2226)           Monitor Interpretation:   sinus tachycardia     Differential Diagnosis/ Diagnostic Considerations: Pulmonary embolism, pneumonia, URI, viral syndrome.     Medical Record Review: I personally reviewed available prior medical records for any recent pertinent discharge summaries, testing, and procedures and reviewed those reports.     Complicating Factors: The patient already has does not have any pertinent problems on file. to contribute to the complexity of this ED evaluation.       REVIEW DOCUMENTATION:     ED Provider Notes        ED Provider Notes signed by Srini Llanos MD at 1/8/2024  1:39 AM       Author: Srini Llanos MD Service: -- Author Type: Physician    Filed: 1/8/2024  1:39 AM Date of Service: 1/8/2024  1:37 AM Status: Signed    : Srini Llanos MD (Physician)         Patient endorsed pending CT chest.  CT with concerning findings of saddle PE and evidence of right heart strain.  Patient with ongoing tachycardia in the ED however other vital signs normal.  Patient was started  on heparin.  I discussed with Dr. Wiggins for admission and Dr. Enrique for IR consultation.  Dr. Enrique recommends heparin anticoagulation overnight, n.p.o. status and plan for intervention in the morning.        [facility] Hospitalist H&P     History of Present Illness: Patient is a 43 year old male with PMH sig for RLE DVT in 1/16/22 s/p 6 months of coumadin no longer on AC who presented to the hospital with worsening LLE swelling and SOB. Patient says that his LLE started to swell about 3 days prior. Yesterday he acute became short of breath. Worse with exertion. Due to this he came to the hospital. In addition he admits to some L sided rib pain when he takes a deep breath or coughs but no other CP. Upon arrival to the ED, he was tachycardic in the 120s but on RA. Initial labwork was unremarkable. CTA chest showed a large saddle PE, extensive b/l Pes, multiple b/l pulmonary infarcts, and RV: LV ration of 1.4. He was started on hep gtt and admitted for further medical management,.     OBJECTIVE:  BP (!) 127/91 (BP Location: Right arm)   Pulse 102   Temp 98.3 °F (36.8 °C) (Oral)   Resp 20   Ht 6' (1.829 m)   Wt 208 lb 5.4 oz (94.5 kg)   SpO2 94%   BMI 28.26 kg/m²   General:  Alert, no distress   Head:  Normocephalic               Neck: Supple   Lungs:   Clear to auscultation bilaterally   Chest wall:  No tenderness or deformity.       Abdomen:   Soft, non-tender. Bowel sounds normal   Extremities: LLE edema     Lab 01/07/24  2219   WBC 9.4   HGB 15.2   MCV 87.4   .0        K 4.3      CO2 28.0   BUN 12   CREATSERUM 0.76   *   CA 9.1       CT CHEST PE AORTA     Saddle PE with propagation throughout all lobes of the lungs including occlusive/near occlusive disease throughout the basilar arteries with imaging signs of RV strain  Multifocal basilar predominant bilateral pulmonary infarcts         ASSESSMENT / PLAN:    Patient is a 43 year old male with PMH sig for RLE DVT in  22 s/p 6 months of coumadin no longer on AC who presented to the hospital with worsening LLE swelling and SOB.    Submassive PE  Acute saddle PE with b/l pulmonary infarcts  Cor pulmonale  - patient presented with LLE edema and acute onset SOB  - only risk factor is daily cigarette smoking, no hx of recent surgery/travel, no family hx of blood clots  - discussed outpatient testing for genetic causes  - CTA chest reviewed, shows saddle PE, extensive b/l PE, multiple pulmonary infarcts, RV: LV ratio of 1.4  - vitals are stable, no hypoxemia  - IR consulted, tentative plan for thrombectomy after discussions with patients wife  - on hep gtt, will transition to likely coumadin for dc ( will check if DOAC is covered). Discussed that patient will need to be on life long anticoagulation  - check b/l LE venous dopplers  - STAT TTE ordered  - only risk factor is daily cigarette smoking    Elevated troponin  - likely demand ischemia from above  - initial troponin of 553  - EKG reviewed, shows sinus tachycardia, no acute ST changes  - will get another troponin for completeness sake  - TTE ordered    Hx of RLE DVT in 22  - s/p completion of 6 months of coumadin    Daily cigarette smoking  - smokes 1ppd  - counseled on smoking cessation for 5 minutes  - nicotine patch ordered    FN:  - IVF: none  - Diet: NPO    DVT Prophy: hep gtt  Lines: PIV    Dispo: pending clinical course      Procedure Note           S[facility]ling Thomas Patient Status:  Inpatient    1980 MRN [id number]   Location [facility] 1W Attending Racquel Mi, DO   Hosp Day # 0 PCP Anita Colunga MD      Procedure: PA angiogram with thrombectomy     Pre-Procedure Diagnosis: Elevated troponin [R79.89]  Acute saddle pulmonary embolism with acute cor pulmonale (HCC) [I26.02]        Post-Procedure Diagnosis: Elevated troponin [R79.89]  Acute saddle pulmonary embolism with acute cor pulmonale (HCC) [I26.02]     Anesthesia:  Local and Sedation      Findings:  Right CFV accessed under ultrasound.  16Fr sheath placed.     Left PA thrombectomy performed with 16Fr Flash device.  Right PA thrombectomy also performed.  Final angiogram with marked reduction in clot burden.     PA pressures:  Pre - peak 53mmHg, mean 37 mmHg   Post - peak 42 mmHg, mean 30 mmHg     Specimens: None     Blood Loss:  180mL                                Complications:  None     Plan:  Transfer back to room.  Continue systemic anticoagulation.  Duration of anticoagulation per Hematology.    Pulmonary Consult      Assessment / Plan:  Acute pulmonary embolism - submassive PE with multiple pulmonary infarcts s/p mechanical thrombectomy of bilateral PA with reduction in clot burden  - Heparin gtt; transition to DOAC indefinitely at DC  - Needs hypercoag w/u and malignancy screen at discharge  - Echo with acute pHTN and reduced right sided systolic failure  - LE doppler positive for DVT  Pulmonary infarct - d/t PE above  - Pain control w/ alternating toradol & tylenol  - Repeat imaging in 6-8 weeks  Pulmonary hypertension - acute from submassive PE  - Repeat TTE 6 months to ensure resolution  Tobacco abuse   - Cessation advised  Dispo  - Will follow with you    MEDICATIONS ADMINISTERED IN LAST 1 DAY:  apixaban (Eliquis) tab 10 mg       Date Action Dose Route User    Discharged on 1/9/2024 1/9/2024 1115 Given 10 mg Oral Maribel De Guzman, IVAN          heparin (Porcine) 41135 units/250mL infusion CONTINUOUS       Date Action Dose Route User    Discharged on 1/9/2024 1/9/2024 0915 Hi-Risk Rate/Dose Change 2,200 Units/hr Intravenous Maribel De Guzman, IVAN    1/9/2024 0344 New Bag 2,050 Units/hr Intravenous Sheila Jones, IVAN    1/9/2024 0209 Hi-Risk Rate/Dose Change 2,050 Units/hr Intravenous Sheila Jones, RN    1/8/2024 1923 Hi-Risk Rate/Dose Change 1,900 Units/hr Intravenous Sheila Jones, RN    1/8/2024 1701 New Bag 1,750 Units/hr Intravenous Lis Strauss RN          ketorolac  (Toradol) 30 MG/ML injection 30 mg       Date Action Dose Route User    Discharged on 1/9/2024 1/8/2024 1719 Given 30 mg Intravenous Lis Strauss RN          nicotine (Nicoderm CQ) 14 MG/24HR patch 1 patch       Date Action Dose Route User    Discharged on 1/9/2024 1/9/2024 0831 Patch Applied 1 patch Transdermal (Left Upper Arm) Maribel De Guzman, RN

## 2024-01-09 NOTE — PLAN OF CARE
Monitoring vital signs- stable at this time. No acute changes noted throughout shift. Telemetry monitoring. Tolerating diet. Voiding by urinal. Heparin gtt for DVT prophylaxis. Up with independently in room. Encouraged frequent position changes and use of incentive spirometer. Fall precautions maintained- bed alarm on, bed locked in lowest position, call light and personal belongings within reach, non-skid socks in place to bilateral feet. Frequent rounding by nursing staff. Plan to discharge home once medically cleared.      Patient cleared by internal medicine and social work. Going home. IV removed, discharge education provided, patient sent home with all personal belongings, and discharge instructions. Addressed additional questions.      Problem: Patient Centered Care  Goal: Patient preferences are identified and integrated in the patient's plan of care  Description: Interventions:  - What would you like us to know as we care for you? Im from home  - Provide timely, complete, and accurate information to patient/family  - Incorporate patient and family knowledge, values, beliefs, and cultural backgrounds into the planning and delivery of care  - Encourage patient/family to participate in care and decision-making at the level they choose  - Honor patient and family perspectives and choices  Outcome: Progressing     Problem: Patient/Family Goals  Goal: Patient/Family Long Term Goal  Description: Patient's Long Term Goal: Get stronger    Interventions:  - See additional Care Plan goals for specific interventions  Outcome: Progressing  Goal: Patient/Family Short Term Goal  Description: Patient's Short Term Goal: Go home    Interventions:   - See additional Care Plan goals for specific interventions  Outcome: Progressing     Problem: CARDIOVASCULAR - ADULT  Goal: Maintains optimal cardiac output and hemodynamic stability  Description: INTERVENTIONS:  - Monitor vital signs, rhythm, and trends  - Monitor for bleeding,  hypotension and signs of decreased cardiac output  - Evaluate effectiveness of vasoactive medications to optimize hemodynamic stability  - Monitor arterial and/or venous puncture sites for bleeding and/or hematoma  - Assess quality of pulses, skin color and temperature  - Assess for signs of decreased coronary artery perfusion - ex. Angina  - Evaluate fluid balance, assess for edema, trend weights  Outcome: Progressing  Goal: Absence of cardiac arrhythmias or at baseline  Description: INTERVENTIONS:  - Continuous cardiac monitoring, monitor vital signs, obtain 12 lead EKG if indicated  - Evaluate effectiveness of antiarrhythmic and heart rate control medications as ordered  - Initiate emergency measures for life threatening arrhythmias  - Monitor electrolytes and administer replacement therapy as ordered  Outcome: Progressing     Problem: RESPIRATORY - ADULT  Goal: Achieves optimal ventilation and oxygenation  Description: INTERVENTIONS:  - Assess for changes in respiratory status  - Assess for changes in mentation and behavior  - Position to facilitate oxygenation and minimize respiratory effort  - Oxygen supplementation based on oxygen saturation or ABGs  - Provide Smoking Cessation handout, if applicable  - Encourage broncho-pulmonary hygiene including cough, deep breathe, Incentive Spirometry  - Assess the need for suctioning and perform as needed  - Assess and instruct to report SOB or any respiratory difficulty  - Respiratory Therapy support as indicated  - Manage/alleviate anxiety  - Monitor for signs/symptoms of CO2 retention  Outcome: Progressing

## 2024-01-09 NOTE — DISCHARGE SUMMARY
General Medicine Discharge Summary     Patient ID:  Sterling Thomas  43 year old  11/11/1980    Admit date: 1/7/2024    Discharge date and time: 01/09/24    Attending Physician: Racquel Mi DO     Consults: IP CONSULT TO INTERVENTIONAL RADIOLOGY  IP CONSULT TO PULMONOLOGY    Primary Care Physician: Anita Colunga MD     Reason for admission: submassive PE    Risk of Readmission Lace+ Score: 24  59-90 High Risk  29-58 Medium Risk  0-28   Low Risk    Discharge Diagnoses: Elevated troponin [R79.89]  Acute saddle pulmonary embolism with acute cor pulmonale (HCC) [I26.02]  See Additional Discharge Diagnoses in Hospital Course    Discharged Condition: good    Follow-up with labs/images appointments:   F/u with PCP  F/u with pulmonary  F/u with hematology    Exam  Gen: No acute distress  Pulm: Lungs clear, normal respiratory effort  CV: Heart with regular rate and rhythm  Abd: Abdomen soft,   EXT: no edema     HPI:   Patient is a 43 year old male with PMH sig for RLE DVT in 1/16/22 s/p 6 months of coumadin no longer on AC who presented to the hospital with worsening LLE swelling and SOB. Patient says that his LLE started to swell about 3 days prior. Yesterday he acute became short of breath. Worse with exertion. Due to this he came to the hospital. In addition he admits to some L sided rib pain when he takes a deep breath or coughs but no other CP. Upon arrival to the ED, he was tachycardic in the 120s but on RA. Initial labwork was unremarkable. CTA chest showed a large saddle PE, extensive b/l Pes, multiple b/l pulmonary infarcts, and RV: LV ration of 1.4. He was started on hep gtt and admitted for further medical management,.      Hospital Course:   Patient is a 43 year old male with PMH sig for RLE DVT in 1/16/22 s/p 6 months of coumadin no longer on AC who presented to the hospital with worsening LLE swelling and SOB. Found to have a submassive PE. Underwent thrombectomy. Started on hep gtt and transition to  eliquis. F/u as above     Submassive PE  Acute saddle PE with b/l pulmonary infarcts  Cor pulmonale  LLE DVT  - patient presented with LLE edema and acute onset SOB  - only risk factor is daily cigarette smoking, no hx of recent surgery/travel, no family hx of blood clots  - discussed outpatient testing for genetic causes  - CTA chest reviewed, shows saddle PE, extensive b/l PE, multiple pulmonary infarcts, RV: LV ratio of 1.4  - vitals are stable, no hypoxemia  - IR consulted, tentative plan for thrombectomy after discussions with patients wife  - on hep gtt, will transition to likely coumadin for dc ( will check if DOAC is covered). Discussed that patient will need to be on life long anticoagulation  - check b/l LE venous dopplers, + for LLE DVT  - STAT TTE ordered, increased RVSP, EF 45-50%, RV size moderately increased  - only risk factor is daily cigarette smoking     Elevated troponin  - likely demand ischemia from above  - initial troponin of 553, repeat of 223  - EKG reviewed, shows sinus tachycardia, no acute ST changes  - will get another troponin for completeness sake  - TTE ordered     Hx of RLE DVT in 1/16/22  - s/p completion of 6 months of coumadin    Elevated LFTs  - mildly elevated and improved while in patient  - recommend outpatient f/u with PCP     Daily cigarette smoking  - smokes 1ppd  - counseled on smoking cessation for 5 minutes  - nicotine patch ordered    Operative Procedures:      Imaging: US VENOUS DOPPLER LEG BILAT - DIAG IMG (CPT=93970)    Result Date: 1/8/2024  CONCLUSION:   Positive DVT extending from the distal left femoral vein into the popliteal veins as well as the peroneal veins.  No right lower extremity DVT.    Dictated by (CST): Leroy Chance MD on 1/08/2024 at 5:22 PM     Finalized by (CST): Leroy Chance MD on 1/08/2024 at 5:24 PM          CT CHEST PE AORTA (IV ONLY) (CPT=71260)    Result Date: 1/8/2024  CONCLUSION:   Saddle PE with propagation throughout all lobes  of the lungs including occlusive/near occlusive disease throughout the basilar arteries with imaging signs of RV strain  Multifocal basilar predominant bilateral pulmonary infarcts   [facility] provided a prelim report for this exam. This final report has no significant discrepancies with the [facility] report.   Dictated by (CST): Prince Penny MD on 1/08/2024 at 9:31 AM     Finalized by (CST): Prince Penny MD on 1/08/2024 at 9:34 AM             Disposition: home    Activity: activity as tolerated  Diet: regular diet  Wound Care: as directed  Code Status: No Order  O2:     Home Medication Changes:     Med list     Medication List        START taking these medications      apixaban 5 MG Tabs  Commonly known as: Eliquis  Take 2 tabs (10mg) by mouth twice daily for 7 days, then take 1 tab (5mg) by mouth twice daily thereafter.            STOP taking these medications      warfarin 5 MG Tabs  Commonly known as: Coumadin               Where to Get Your Medications        These medications were sent to [id number] - [place], [place] - [address] AT [facility] [place], [phone], [phone]  [address], [facility] [address]      Phone: [phone]   apixaban 5 MG Tabs         FU   Follow-up Information       Bairon Jordan,  Follow up.    Specialty: PULMONARY DISEASES  Why: follow up in 6-8 weeks  Contact information:  [address]  [address]  [facility] [id number] [phone]               Osmany Gonzales MD Follow up.    Specialties: Hematology and Oncology, ONCOLOGY, HEMATOLOGY  Why: blood doctor  Contact information:  [address]  [facility] [id number] [phone]               Anita Colunga MD. Schedule an appointment as soon as possible for a visit.    Specialty: Internal Medicine  Contact information:  [address] [place] [id number] [phone]                             [place] instructions:      I reconciled current and discharge medications on day  of discharge, discussed changes with patient and noted changes above.       Total Time Coordinating Care: Greater than 30 minutes    Patient had opportunity to ask questions and state understand and agree with therapeutic plan as outlined    Thank You,    Racquel Mi DO   Hospitalist with [facility]

## 2024-01-11 NOTE — PAYOR COMM NOTE
--------------  DISCHARGE REVIEW    Payor: [facility]  Subscriber #:  [id number]  Authorization Number: [id number]    Admit date: 1/8/24  Admit time:   2:05 AM  Discharge Date: 1/9/2024 11:34 AM     Admitting Physician: Rosalie Altamirano DO  Attending Physician:  No att. providers found  Primary Care Physician: Anita Colunga MD          Discharge Summary Notes        Discharge Summary signed by Racquel Mi DO at 1/9/2024 11:00 AM       Author: Racquel Mi DO Specialty: HOSPITALIST Author Type: Physician    Filed: 1/9/2024 11:00 AM Date of Service: 1/9/2024  9:55 AM Status: Addendum    : Racquel Mi DO (Physician)    Related Notes: Original Note by Racquel Mi DO (Physician) filed at 1/9/2024  9:57 AM           General Medicine Discharge Summary     Patient ID:  Sterling Thomas  43 year old  11/11/1980    Admit date: 1/7/2024    Discharge date and time: 01/09/24    Attending Physician: Racquel Mi DO     Consults: IP CONSULT TO INTERVENTIONAL RADIOLOGY  IP CONSULT TO PULMONOLOGY    Primary Care Physician: Anita Colunga MD     Reason for admission: submassive PE    Risk of Readmission Lace+ Score: 24  59-90 High Risk  29-58 Medium Risk  0-28   Low Risk    Discharge Diagnoses: Elevated troponin [R79.89]  Acute saddle pulmonary embolism with acute cor pulmonale (HCC) [I26.02]  See Additional Discharge Diagnoses in Hospital Course    Discharged Condition: good    Follow-up with labs/images appointments:   F/u with PCP  F/u with pulmonary  F/u with hematology    Exam  Gen: No acute distress  Pulm: Lungs clear, normal respiratory effort  CV: Heart with regular rate and rhythm  Abd: Abdomen soft,   EXT: no edema     HPI:   Patient is a 43 year old male with PMH sig for RLE DVT in 1/16/22 s/p 6 months of coumadin no longer on AC who presented to the hospital with worsening LLE swelling and SOB. Patient says that his LLE started to swell about  3 days prior. Yesterday he acute became short of breath. Worse with exertion. Due to this he came to the hospital. In addition he admits to some L sided rib pain when he takes a deep breath or coughs but no other CP. Upon arrival to the ED, he was tachycardic in the 120s but on RA. Initial labwork was unremarkable. CTA chest showed a large saddle PE, extensive b/l Pes, multiple b/l pulmonary infarcts, and RV: LV ration of 1.4. He was started on hep gtt and admitted for further medical management,.      Hospital Course:   Patient is a 43 year old male with PMH sig for RLE DVT in 1/16/22 s/p 6 months of coumadin no longer on AC who presented to the hospital with worsening LLE swelling and SOB. Found to have a submassive PE. Underwent thrombectomy. Started on hep gtt and transition to eliquis. F/u as above     Submassive PE  Acute saddle PE with b/l pulmonary infarcts  Cor pulmonale  LLE DVT  - patient presented with LLE edema and acute onset SOB  - only risk factor is daily cigarette smoking, no hx of recent surgery/travel, no family hx of blood clots  - discussed outpatient testing for genetic causes  - CTA chest reviewed, shows saddle PE, extensive b/l PE, multiple pulmonary infarcts, RV: LV ratio of 1.4  - vitals are stable, no hypoxemia  - IR consulted, tentative plan for thrombectomy after discussions with patients wife  - on hep gtt, will transition to likely coumadin for dc ( will check if DOAC is covered). Discussed that patient will need to be on life long anticoagulation  - check b/l LE venous dopplers, + for LLE DVT  - STAT TTE ordered, increased RVSP, EF 45-50%, RV size moderately increased  - only risk factor is daily cigarette smoking     Elevated troponin  - likely demand ischemia from above  - initial troponin of 553, repeat of 223  - EKG reviewed, shows sinus tachycardia, no acute ST changes  - will get another troponin for completeness sake  - TTE ordered     Hx of RLE DVT in 1/16/22  - s/p  completion of 6 months of coumadin    Elevated LFTs  - mildly elevated and improved while in patient  - recommend outpatient f/u with PCP     Daily cigarette smoking  - smokes 1ppd  - counseled on smoking cessation for 5 minutes  - nicotine patch ordered    Operative Procedures:      Imaging: US VENOUS DOPPLER LEG BILAT - DIAG IMG (CPT=93970)    Result Date: 1/8/2024  CONCLUSION:   Positive DVT extending from the distal left femoral vein into the popliteal veins as well as the peroneal veins.  No right lower extremity DVT.    Dictated by (CST): Leroy Chance MD on 1/08/2024 at 5:22 PM     Finalized by (CST): Leroy Chance MD on 1/08/2024 at 5:24 PM          CT CHEST PE AORTA (IV ONLY) (CPT=71260)    Result Date: 1/8/2024  CONCLUSION:   Saddle PE with propagation throughout all lobes of the lungs including occlusive/near occlusive disease throughout the basilar arteries with imaging signs of RV strain  Multifocal basilar predominant bilateral pulmonary infarcts   [facility] provided a prelim report for this exam. This final report has no significant discrepancies with the [facility] report.   Dictated by (CST): Prince Penny MD on 1/08/2024 at 9:31 AM     Finalized by (CST): Prince Penny MD on 1/08/2024 at 9:34 AM             Disposition: home    Activity: activity as tolerated  Diet: regular diet  Wound Care: as directed  Code Status: No Order  O2:     Home Medication Changes:     Med list     Medication List        START taking these medications      apixaban 5 MG Tabs  Commonly known as: Eliquis  Take 2 tabs (10mg) by mouth twice daily for 7 days, then take 1 tab (5mg) by mouth twice daily thereafter.            STOP taking these medications      warfarin 5 MG Tabs  Commonly known as: Coumadin               Where to Get Your Medications        These medications were sent to [id number] - [place], [place] - [address] AT [facility], [phone], [phone]  [address]  [place] [address]      Phone: [phone]   apixaban 5 MG Tabs         FU   Follow-up Information       Bairon Jordan DO Follow up.    Specialty: PULMONARY DISEASES  Why: follow up in 6-8 weeks  Contact information:  [address]  [address]  [facility] [id number] [phone]               Osmany Gonzales MD Follow up.    Specialties: Hematology and Oncology, ONCOLOGY, HEMATOLOGY  Why: blood doctor  Contact information:  [address]  [facility] [id number] [phone]               Anita Colunga MD. Schedule an appointment as soon as possible for a visit.    Specialty: Internal Medicine  Contact information:  [address] [place] [id number] [phone]                             [place] instructions:      I reconciled current and discharge medications on day of discharge, discussed changes with patient and noted changes above.       Total Time Coordinating Care: Greater than 30 minutes    Patient had opportunity to ask questions and state understand and agree with therapeutic plan as outlined    Thank You,    Racquel Mi DO   Hospitalist with [facility]         Electronically signed by Racquel Mi DO on 1/9/2024 11:00 AM         REVIEWER COMMENTS

## (undated) NOTE — LETTER
[place] [address]  Authorization for Invasive Procedures  Date: January 8,2024           Time: 0829    I hereby authorize , my physician and his/her assistants (if applicable), which may include medical students, residents, and/or fellows, to perform the following surgical operation/ procedure and administer such anesthesia as may be determined necessary by my physician: Pulmonary angiogram, thrombectomy, possible thrombolysis on Sterling Thomas  2.   I recognize that during the surgical operation/procedure, unforeseen conditions may necessitate additional or different procedures than those listed above.  I, therefore, further authorize and request that the above-named surgeon, assistants, or designees perform such procedures as are, in their judgment, necessary and desirable.    3.   My surgeon/physician has discussed prior to my surgery the potential benefits, risks and side effects of this procedure; the likelihood of achieving goals; and potential problems that might occur during recuperation.  They also discussed reasonable alternatives to the procedure, including risks, benefits, and side effects related to the alternatives and risks related to not receiving this procedure.  I have had all my questions answered and I acknowledge that no guarantee has been made as to the result that may be obtained.    4.   Should the need arise during my operation/procedure, which includes change of level of care prior to discharge, I also consent to the administration of blood and/or blood products.  Further, I understand that despite careful testing and screening of blood or blood products by collecting agencies, I may still be subject to ill effects as a result of receiving a blood transfusion and/or blood products.  The following are some, but not all, of the potential risks that can occur: fever and allergic reactions, hemolytic reactions, transmission of diseases such as  Hepatitis, AIDS and Cytomegalovirus (CMV) and fluid overload.  In the event that I wish to have an autologous transfusion of my own blood, or a directed donor transfusion, I will discuss this with my physician.   Check only if Refusing Blood or Blood Products  I understand refusal of blood or blood products as deemed necessary by my physician may have serious consequences to my condition to include possible death. I hereby assume responsibility for my refusal and release the hospital, its personnel, and my physicians from any responsibility for the consequences of my refusal.         o  Refuse         5.   I authorize the use of any specimen, organs, tissues, body parts or foreign objects that may be removed from my body during the operation/procedure for diagnosis, research or teaching purposes and their subsequent disposal by hospital authorities.  I also authorize the release of specimen test results and/or written reports to my treating physician on the hospital medical staff or other referring or consulting physicians involved in my care, at the discretion of the Pathologist or my treating physician.    6.   I consent to the photographing or videotaping of the operations or procedures to be performed, including appropriate portions of my body for medical, scientific, or educational purposes, provided my identity is not revealed by the pictures or by descriptive texts accompanying them.  If the procedure has been photographed/videotaped, the surgeon will obtain the original picture, image, videotape or CD.  The hospital will not be responsible for storage, release or maintenance of the picture, image, tape or CD.    7.   I consent to the presence of a  or observers in the operating room as deemed necessary by my physician or their designees.    8.   I recognize that in the event my procedure results in extended X-Ray/fluoroscopy time, I may develop a skin reaction.    9. If I have a Do Not  Attempt Resuscitation (DNAR) order in place, that status will be suspended while in the operating room, procedural suite, and during the recovery period unless otherwise explicitly stated by me (or a person authorized to consent on my behalf). The surgeon or my attending physician will determine when the applicable recovery period ends for purposes of reinstating the DNAR order.  10. Patients having a sterilization procedure: I understand that if the procedure is successful the results will be permanent and it will therefore be impossible for me to inseminate, conceive, or bear children.  I also understand that the procedure is intended to result in sterility, although the result has not been guaranteed.   11. I acknowledge that my physician has explained sedation/analgesia administration to me including the risk and benefits I consent to the administration of sedation/analgesia as may be necessary or desirable in the judgment of my physician.    I CERTIFY THAT I HAVE READ AND FULLY UNDERSTAND THE ABOVE CONSENT TO OPERATION and/or OTHER PROCEDURE.        ____________________________________       _________________________________      ______________________________  Signature of Patient         Signature of Responsible Person        Printed Name of Responsible Person        ____________________________________      _________________________________      ______________________________       Signature of Witness          Relationship to Patient                       Date                                       Time    Patient Name: Sterling Thomas     : 1980                 Printed: 2024      Medical Record #: [id number]                      Page 1 of 2          STATEMENT OF PHYSICIAN My signature below affirms that prior to the time of the procedure; I have explained to the patient and/or his/her legal representative, the risks and benefits involved in the proposed treatment and any reasonable  alternative to the proposed treatment. I have also explained the risks and benefits involved in refusal of the proposed treatment and alternatives to the proposed treatment and have answered the patient's questions. If I have a significant financial interest in a co-management agreement or a significant financial interest in any product or implant, or other significant relationship used in this procedure/surgery, I have disclosed this and had a discussion with my patient.     _______________________________________________________________ _____________________________  (Signature of Physician)                                                                                         (Date)                                   (Time)    Patient Name: Sterling Thomas     : 1980                 Printed: 2024      Medical Record #: [id number]                      Page 2 of 2